# Patient Record
Sex: MALE | Race: WHITE | URBAN - METROPOLITAN AREA
[De-identification: names, ages, dates, MRNs, and addresses within clinical notes are randomized per-mention and may not be internally consistent; named-entity substitution may affect disease eponyms.]

---

## 2022-08-05 ENCOUNTER — OFFICE (OUTPATIENT)
Dept: URBAN - METROPOLITAN AREA CLINIC 18 | Facility: CLINIC | Age: 62
End: 2022-08-05
Payer: MEDICARE

## 2022-08-05 VITALS
WEIGHT: 159 LBS | SYSTOLIC BLOOD PRESSURE: 124 MMHG | HEIGHT: 68 IN | HEART RATE: 74 BPM | DIASTOLIC BLOOD PRESSURE: 70 MMHG

## 2022-08-05 DIAGNOSIS — K64.9 UNSPECIFIED HEMORRHOIDS: ICD-10-CM

## 2022-08-05 PROCEDURE — 99213 OFFICE O/P EST LOW 20 MIN: CPT | Performed by: NURSE PRACTITIONER

## 2022-08-05 NOTE — SERVICEHPINOTES
Darren Arredondo   is seen today for a follow-up visit.    He is a 62-year-old male with a PMH significant for CAD status post PCI with multiple stent placements on dual antiplatelet therapy with Plavix and aspirin. He is an established patient of Dr. Esparza who presents for recurrent rectal bleeding due to internal hemorrhoids. He was previously seen in contact for the same in June which treatment options are limited by the patient's comorbidities with high risk for anesthesia as well as his Plavix cannot be stopped. Patient was conservatively treated with hemorrhoidal preparation.br
br Patient presents today with complaints of recurrent rectal bleeding that is moderate to large in amount. He has been passing clots. He did not report any associated dyspnea, chest pain or dizziness.  States he has been bleeding daily for the last 3 days. States that he does not have any significant hemorrhoidal pain but they do itch.

## 2022-08-05 NOTE — SERVICENOTES
Prescription was provided for you to use Preparation H maximum strength applied to the rectum/anus 2-3 times daily for the next 14 days.  Please also use sitz baths for 10 to 15 minutes 3-4 times daily.